# Patient Record
Sex: FEMALE | Race: WHITE | NOT HISPANIC OR LATINO | URBAN - METROPOLITAN AREA
[De-identification: names, ages, dates, MRNs, and addresses within clinical notes are randomized per-mention and may not be internally consistent; named-entity substitution may affect disease eponyms.]

---
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
## 2022-01-04 ENCOUNTER — APPOINTMENT (OUTPATIENT)
Dept: PEDIATRIC ORTHOPEDIC SURGERY | Facility: CLINIC | Age: 22
End: 2022-01-04
Payer: COMMERCIAL

## 2022-01-04 DIAGNOSIS — Z78.9 OTHER SPECIFIED HEALTH STATUS: ICD-10-CM

## 2022-01-04 DIAGNOSIS — M41.125 ADOLESCENT IDIOPATHIC SCOLIOSIS, THORACOLUMBAR REGION: ICD-10-CM

## 2022-01-04 PROCEDURE — 77072 BONE AGE STUDIES: CPT

## 2022-01-04 PROCEDURE — 72082 X-RAY EXAM ENTIRE SPI 2/3 VW: CPT

## 2022-01-04 PROCEDURE — 99203 OFFICE O/P NEW LOW 30 MIN: CPT | Mod: 25

## 2022-01-05 PROBLEM — M41.125 ADOLESCENT IDIOPATHIC SCOLIOSIS OF THORACOLUMBAR REGION: Status: ACTIVE | Noted: 2022-01-04

## 2022-01-05 PROBLEM — Z78.9 NO PERTINENT PAST MEDICAL HISTORY: Status: RESOLVED | Noted: 2022-01-05 | Resolved: 2022-01-05

## 2022-01-05 RX ORDER — DULOXETINE HYDROCHLORIDE 60 MG/1
60 CAPSULE, DELAYED RELEASE ORAL
Refills: 0 | Status: ACTIVE | COMMUNITY

## 2022-01-05 NOTE — REVIEW OF SYSTEMS
[Joint Pains] : arthralgias [Appropriate Age Development] : development appropriate for age [Change in Activity] : no change in activity [Fever Above 102] : no fever [Rash] : no rash [Heart Problems] : no heart problems [Congestion] : no congestion [Back Pain] : ~T no back pain [Sleep Disturbances] : ~T no sleep disturbances

## 2022-01-05 NOTE — DATA REVIEWED
[de-identified] : ap and lateral entire spine: approx 16 degree thoracolumbar curve noted. Skeletally mature. Lateral view some increase in kyphosis to approx 52 degree thoracic region. No wedging noted. Small LLD approx 1cm noted left shorter than right\par

## 2022-01-05 NOTE — HISTORY OF PRESENT ILLNESS
[0] : currently ~his/her~ pain is 0 out of 10 [FreeTextEntry1] : 21-year-old female presents with her mother for evaluation of her spine.  The patient states that she has been followed by Dr. Karon Blackwell for scoliosis for years she last saw this physician approximately 3-4 years ago.  He denies any back pain at this time.  She denies any numbness or tingling.  She denies any bowel or bladder incontinence.  She also complained of bilateral toe pain.  She attends [employer] and does a lot of walking in [place] and states that she was having toe pain with walking.  Since being on break from school the pain has improved.  She no longer has any pain in the toes.  She was wearing Uggs for walking.

## 2022-04-20 ENCOUNTER — APPOINTMENT (OUTPATIENT)
Dept: OBGYN | Facility: CLINIC | Age: 22
End: 2022-04-20

## 2022-08-17 ENCOUNTER — APPOINTMENT (OUTPATIENT)
Dept: OBGYN | Facility: CLINIC | Age: 22
End: 2022-08-17

## 2022-08-18 ENCOUNTER — APPOINTMENT (OUTPATIENT)
Dept: SURGICAL ONCOLOGY | Facility: CLINIC | Age: 22
End: 2022-08-18

## 2022-08-18 VITALS
RESPIRATION RATE: 15 BRPM | BODY MASS INDEX: 31.7 KG/M2 | SYSTOLIC BLOOD PRESSURE: 124 MMHG | TEMPERATURE: 98.6 F | OXYGEN SATURATION: 98 % | WEIGHT: 151 LBS | DIASTOLIC BLOOD PRESSURE: 81 MMHG | HEIGHT: 58 IN | HEART RATE: 88 BPM

## 2022-08-18 DIAGNOSIS — N63.20 UNSPECIFIED LUMP IN THE LEFT BREAST, UNSPECIFIED QUADRANT: ICD-10-CM

## 2022-08-18 PROCEDURE — 99203 OFFICE O/P NEW LOW 30 MIN: CPT

## 2022-08-26 PROBLEM — N63.20 LEFT BREAST MASS: Status: ACTIVE | Noted: 2022-08-26

## 2022-08-26 NOTE — HISTORY OF PRESENT ILLNESS
[de-identified] : Ms. Alaniz is a 20 y/o female who self-palpated a mass in the inner lower left breast skin.\par Mass was associated with pain and drainage.\par Since drainage, lesion has become smaller and no longer painful.

## 2022-08-26 NOTE — PHYSICAL EXAM
[FreeTextEntry1] : Left breast: small cutaneous nodule in the skin of the inner lower breast without significant subcutaneous tissue involvement.

## 2022-08-26 NOTE — ASSESSMENT
[FreeTextEntry1] : Suspect epidermal inclusion cyst of the skin of the inner lower left breast.\par Benign.\par US evaluation for confirmation.

## 2022-09-07 ENCOUNTER — APPOINTMENT (OUTPATIENT)
Dept: OBGYN | Facility: CLINIC | Age: 22
End: 2022-09-07

## 2022-09-23 NOTE — ED PEDIATRIC TRIAGE NOTE - LOCATION:
BD DEXA SCAN AXIAL SKELETON: Result Notes     Artur Briones MD   9/15/2022 12:55 PM CDT         Let patient know that bone density stable.  There is slight improvement in the spine.  Continue same treatment for now     Spoke w/ pt's son, informed of result note above. Informed pt can now be scheduled for Prolia. Pt will come on Friday morning before she sees Dr. Montaño.  
Left arm;

## 2023-02-09 NOTE — ED BEHAVIORAL HEALTH ASSESSMENT NOTE - PSYCHIATRIC ISSUES AND PLAN (INCLUDE STANDING AND PRN MEDICATION)
Lithium 1350 mg po qhs for depression, metformin 500 mg for weight control. Xolair Counseling:  Patient informed of potential adverse effects including but not limited to fever, muscle aches, rash and allergic reactions.  The patient verbalized understanding of the proper use and possible adverse effects of Xolair.  All of the patient's questions and concerns were addressed.

## 2023-03-07 NOTE — ED PEDIATRIC TRIAGE NOTE - PRO INTERPRETER NEED 2
Spoke with patient's  and patient is continuing to take medication daily. Advised refill will be sent and before next refill new lab work will be needed. Patient's  normalized understanding.   English

## 2023-06-02 ENCOUNTER — EMERGENCY (EMERGENCY)
Facility: HOSPITAL | Age: 23
LOS: 1 days | Discharge: HOME | End: 2023-06-02
Attending: INTERNAL MEDICINE | Admitting: INTERNAL MEDICINE
Payer: COMMERCIAL

## 2023-06-02 VITALS
DIASTOLIC BLOOD PRESSURE: 82 MMHG | OXYGEN SATURATION: 99 % | RESPIRATION RATE: 16 BRPM | SYSTOLIC BLOOD PRESSURE: 125 MMHG | WEIGHT: 139.99 LBS | HEART RATE: 95 BPM | HEIGHT: 58 IN | TEMPERATURE: 98 F

## 2023-06-02 DIAGNOSIS — Z98.890 OTHER SPECIFIED POSTPROCEDURAL STATES: Chronic | ICD-10-CM

## 2023-06-02 PROCEDURE — 99283 EMERGENCY DEPT VISIT LOW MDM: CPT

## 2023-06-02 RX ADMIN — Medication 1 TABLET(S): at 01:33

## 2023-06-02 NOTE — ED PROVIDER NOTE - NSFOLLOWUPINSTRUCTIONS_ED_ALL_ED_FT
Soak your toe in warm solution mixed with betadine three times daily, Take Augmentin 875mg twice daily, Follow up with the foot doctor

## 2023-06-02 NOTE — ED PROVIDER NOTE - OBJECTIVE STATEMENT
22 yr old female c/o toes infection of the right 1st toe x 2 weeks  Infected toe 22 yr old female c/o right great toe infection x 2 weeks, no fever, no rash no toxemia, no streak

## 2023-06-02 NOTE — ED PROVIDER NOTE - CARE PROVIDER_API CALL
Yariel Galindo  Podiatric Medicine and Surgery  [address] [place] [address]  Phone: [phone]  Fax: [phone]  Follow Up Time: 1-3 Days

## 2023-06-02 NOTE — ED PROVIDER NOTE - PATIENT PORTAL LINK FT
You can access the FollowMyHealth Patient Portal offered by [facility] by registering at the following website: [email or web address]. By joining [facility]’s FollowMyHealth portal, you will also be able to view your health information using other applications (apps) compatible with our system.

## 2023-06-02 NOTE — ED ADULT NURSE NOTE - NSFALLUNIVINTERV_ED_ALL_ED
Bed/Stretcher in lowest position, wheels locked, appropriate side rails in place/Call bell, personal items and telephone in reach/Instruct patient to call for assistance before getting out of bed/chair/stretcher/Non-slip footwear applied when patient is off stretcher/[place] to call system/Physically safe environment - no spills, clutter or unnecessary equipment/Purposeful proactive rounding/Room/bathroom lighting operational, light cord in reach

## 2023-06-02 NOTE — ED ADULT NURSE NOTE - CAS TRG GENERAL NORM CIRC DET
[FreeTextEntry1] : 51F with HIV and ASCUS\par \par Patient's exam was normal today.  She will return in 1 year.  If she continues to have abnormal anal pap we will plan for HRA.
Strong peripheral pulses

## 2023-10-11 NOTE — ED ADULT NURSE NOTE - NS PRO PASSIVE SMOKE EXP
PHYSICAL EXAM:    GENERAL: Alert, appears stated age, well appearing, non-toxic  SKIN: Warm, and dry. MMM.   HEAD: NC, AT, no step offs   EYE: Normal lids/conjunctiva, PERRL, EOMI  ENT: Normal hearing, patent oropharynx   NECK: +supple. No meningismus, or JVD, +Trachea midline. no midline tenderness/step offs. +R paracervical ttp.   Pulm: Bilateral BS, normal resp effort, no wheezes, stridor, or retractions  CV: RRR, no M/R/G, 2+and = radial pulses  Abd: soft, non-tender, non-distended no seatbelt sign    Mskel: no erythema, cyanosis, edema. no calf tenderness no spinal ttp. + R paralumbar ttp. +mild R shoulder/R elbow ttp. no decreased rom. cap refill brisk.   Neuro: AAOx3, no sensory/motor deficits, No speech slurring, pronator drift, facial asymmetry. normal finger-to-nose b/l. 5/5 strength throughout. normal gait.
No
